# Patient Record
(demographics unavailable — no encounter records)

---

## 2024-10-31 NOTE — DISCUSSION/SUMMARY
[FreeTextEntry1] :   This is 70-year-old female with past medical history significant for familial hypercholesterolemia, statin intolerance, status post hysterectomy, status post hernia repair, history of restless leg syndrome, who comes in for lipid consultation. The patient is followed closely by her cardiologist Dr. Marie. The patient is receiving her Leqvio at an infusion center at [facility].  (She received her first shot March 21, 2024, June 24, 2024, December 2023) She remains on Zetia 10 mg/day. She denies chest pain, shortness of breath, dizziness or syncope. She has no history of rheumatic fever.  She does not drink excessive caffeine or alcohol. Cardiac risk factors include hypercholesterolemia; (mother and father and 2 siblings, and 2 children all have hypercholesterolemia). The patient had a positive genetic test for LDL receptor mutation consistent with heterozygous familial hypercholesterolemia. I have recommended that her first-degree relatives, and children be tested for LDL receptor mutation as well. Have instructed her to contact the genetic testing company who will screen her family as well. Blood work done October 24, 2024 demonstrated lipoprotein a of less than 9 nmol/L. The patient will have blood work done today for lipid panel, lipoprotein B, and direct LDL cholesterol. If she is unable to achieve LDL cholesterol target with Leqvio and Zetia, she is a candidate for Bempedoic Acid. The patient has been unable to tolerate atorvastatin, or rosuvastatin even low dosages, simvastatin. She is currently on Leqvio subcutaneously every 6-month (she had her first dose and her 90-day second dose), and Zetia 10 mg daily. She reports that PCSK9 injectable therapy was cost prohibitive. Lipid panel done July 24, 2024 on Leqvio therapy demonstrated total cholesterol of 333 mg/dL, HDL 53 mg/dL, triglycerides 221 mg/dL, LDL calculated 239 mg/dL, and elevated lipoprotein B of 200 mg/dL. The patient has findings consistent with familial hypercholesterolemia which appears to be unresponsive to lipid-lowering therapy. If the patient is unable to achieve LDL target levels, despite effective lipid-lowering therapy, she would be a candidate for Lipid Apheresis for familial hypercholesterolemia despite maximal lipid-lowering therapy. The patient reports no known history of coronary artery disease, however I would recommend she schedule coronary artery calcium score to see if there is evidence for early atherosclerotic heart disease.  The patient is instructed follow-up with her primary cardiologist, Dr. Marie. If the patient is unable to achieve adequate LDL cholesterol, she remains a candidate for Lipid Apheresis Further recommendations are made after results of her blood work are available for my review. The patient understands that aerobic exercises must be increased to 40 minutes 4 times per week. A detailed discussion of lifestyle modification was done today. The patient has a good understanding of the diagnosis, and treatment plan. Lifestyle modification was also outlined. Thank you for allowing to participate in the care of your patient.  Please do not hesitate to call if you have any further questions.

## 2024-10-31 NOTE — PHYSICAL EXAM
[Well Developed] : well developed [Well Nourished] : well nourished [No Acute Distress] : no acute distress [Normal Conjunctiva] : normal conjunctiva [Normal Venous Pressure] : normal venous pressure [No Carotid Bruit] : no carotid bruit [Normal S1, S2] : normal S1, S2 [No Murmur] : no murmur [No Rub] : no rub [No Gallop] : no gallop [5th Left ICS - MCL] : palpated at the 5th LICS in the midclavicular line [Normal] : normal [No Precordial Heave] : no precordial heave was noted [Normal Rate] : normal [Rhythm Regular] : regular [Normal S1] : normal S1 [Normal S2] : normal S2 [II] : a grade 2 [No Pitting Edema] : no pitting edema present [2+] : left 2+ [Clear Lung Fields] : clear lung fields [Good Air Entry] : good air entry [No Respiratory Distress] : no respiratory distress  [Soft] : abdomen soft [Non Tender] : non-tender [No Masses/organomegaly] : no masses/organomegaly [Normal Bowel Sounds] : normal bowel sounds [Normal Gait] : normal gait [No Edema] : no edema [No Cyanosis] : no cyanosis [No Clubbing] : no clubbing [No Varicosities] : no varicosities [No Rash] : no rash [No Skin Lesions] : no skin lesions [Moves all extremities] : moves all extremities [No Focal Deficits] : no focal deficits [Normal Speech] : normal speech [Alert and Oriented] : alert and oriented [Normal memory] : normal memory [Apical Thrill] : no thrill palpable at the apex [S3] : no S3 [S4] : no S4 [Click] : no click [Pericardial Rub] : no pericardial rub [Rt] : no varicose veins of the right leg [Lt] : no varicose veins of the left leg [Right Carotid Bruit] : no bruit heard over the right carotid [Left Carotid Bruit] : no bruit heard over the left carotid [Bruit] : no bruit heard

## 2024-10-31 NOTE — REASON FOR VISIT
[CV Risk Factors and Non-Cardiac Disease] : CV risk factors and non-cardiac disease [Hyperlipidemia] : hyperlipidemia [FreeTextEntry3] : Dr. Sean Marie  [FreeTextEntry1] :   This is a 71 y/o female with pmhx of HLD, cervical cancer s/p hysterectomy, s/p hernia repair, restless leg syndrome. Presents to our clinic for lipid follow-up evaluation referred by Dr. Sean Marie at [facility]   CHIEF COMPLAINT: Patient denies any current complaints. Denies chest pain, palpitations, shortness of breath, syncope, THOMAS, orthopnea, or lower extremity edema. MEDICATION: On Leqvio, Ezetimibe, Coq10 Patient has been on statins for years, continuously changing classes due to intolerance. Stopped taking Rosuvastatin in 2024 due to incapacitating leg cramping and upper extremity myalgias.  She was on Repatha and praluent up until December due to lack of sufficient insurance coverage with copay of $595. She was started on Inclisiran (Leqvio) has had 2 doses so far last one in    Never tested for LP (a), Never had a calcium score   Fmhx: Parents, 2 sisters, 2 children with HLD Scoial hx: non smoker, social alcohol intkae, drinks 2 cups of coffee per day  [facility] Hx: hysterectomy , hernia repair ,   Labs and procedures: : LDL: 239, T< HDL: 53, Cholesterol: 333, ApoB: 200 Plan:  Patient with severe statin intolerance and incapacitating cramps (failed Atorvastatin and rosuvastatin) presenting from primary cardiologist for elevated LDL to 239 despite being on inclisiran and ezetimibe. Prior use of Repatha and Praluent.  Patient has been re-challenged with statins, remains not tolerant  Will do genetic testing, assess LP(a0) levels Patient was informed on possible need of lipid apheresis if LDL levels are not within range and elevated LP(a) Pending calcium score assessment for CAD in next viist.

## 2025-02-25 NOTE — DISCUSSION/SUMMARY
[FreeTextEntry1] : This is 71-year-old female with past medical history significant for familial hypercholesterolemia, positive genetic mutation for LDL receptor gene, statin intolerance, status post hysterectomy, status post hernia repair, history of restless leg syndrome, who comes in for lipid follow-up evaluation. The patient is followed closely by her cardiologist Dr. Marie. The patient is receiving her Leqvio at an infusion center at [facility].  (She received her first shot March 21, 2024, June 24, 2024, December 2023) She remains on Zetia 10 mg/day. She denies chest pain, shortness of breath, dizziness or syncope. She has no history of rheumatic fever.  She does not drink excessive caffeine or alcohol. Cardiac risk factors include hypercholesterolemia; (mother and father and 2 siblings, and 2 children all have hypercholesterolemia). The patient had a positive genetic test for LDL receptor mutation consistent with heterozygous familial hypercholesterolemia. I have recommended that her first-degree relatives, and children be tested for LDL receptor mutation as well. I have instructed her to contact the genetic testing company who will screen her family as well.  Lipid panel done January 21, 2025 demonstrated a total cholesterol 336 mg/dL, LDL calculated 236 mg/dL, triglycerides 208 mg/dL, HDL 61 mg/dL, non-HDL cholesterol 275 mg/dL, and LDL direct of 254 mg/dL.  These results are in the setting of Leqvio and Zetia therapy. The patient clearly has familial hypercholesterolemia.  At this point in time the most appropriate therapy for her is lipoprotein apheresis.  I have explained this procedure to her and her  in detail including the placement of 2 subcutaneous ports for the procedure, and initially a biweekly treatment schedule.  They verbalized a good understanding of the procedure, and understand the commitment. She lives over an hour away and would have to pay for transportation, which she will look into. Information was sent for insurance clearance prior to the procedure. She has evidence for coronary artery disease, coronary artery calcifications, familial hypercholesterolemia, Statin intolerance, LDL receptor mutation with ineffectiveness of Leqvio and Zetia therapy. Her lipoprotein a is less than 9 nmol/L. Coronary artery calcium score done November 21, 2024 was 886 units including 208 units in the left anterior descending artery, 172 units in left circumflex artery, 506 units in the right coronary artery, 0 units in left main artery. The patient has been unable to tolerate atorvastatin, or rosuvastatin even low dosages, simvastatin. She is currently on Leqvio subcutaneously every 6-month (she had her first dose and her 90-day second dose), and Zetia 10 mg daily. She reports that PCSK9 injectable therapy was cost prohibitive. Lipid panel done July 24, 2024 on Leqvio therapy demonstrated total cholesterol of 333 mg/dL, HDL 53 mg/dL, triglycerides 221 mg/dL, LDL calculated 239 mg/dL, and elevated lipoprotein B of 200 mg/dL. The patient has findings consistent with familial hypercholesterolemia which appears to be unresponsive to lipid-lowering therapy.  The patient is instructed follow-up with her primary cardiologist, Dr. Marie.  The patient understands that aerobic exercises must be increased to 40 minutes 4 times per week. A detailed discussion of lifestyle modification was done today. The patient has a good understanding of the diagnosis, and treatment plan. Lifestyle modification was also outlined. Thank you for allowing to participate in the care of your patient.  Please do not hesitate to call if you have any further questions.

## 2025-02-25 NOTE — REASON FOR VISIT
[CV Risk Factors and Non-Cardiac Disease] : CV risk factors and non-cardiac disease [Hyperlipidemia] : hyperlipidemia [FreeTextEntry3] : Dr. Sean Marie  [FreeTextEntry1] : 70 y/o female with pmhx of HLD, cervical cancer s/p hysterectomy, s/p hernia repair, restless leg syndrome presents for lipid follow-up evaluation referred by Dr. Sean Marie at [facility] [place]   CHIEF COMPLAINT: Patient denies any current complaints. Denies chest pain, palpitations, shortness of breath, syncope, THOMAS, orthopnea, or lower extremity edema. MEDICATION: On Leqvio, Ezetimibe, Coq10 Patient has been on statins for years, continuously changing classes due to intolerance. Stopped taking Rosuvastatin in January of 2024 due to incapacitating leg cramping and upper extremity myalgias.  She was on Repatha and praluent up until December due to lack of sufficient insurance coverage with copay of $595. She was started on Inclisiran (Leqvio) has had 2 doses so far last one in June   Fmhx: Parents, 2 sisters, 2 children with HLD Scoial hx: non smoker, social alcohol intkae, drinks 2 cups of coffee per day  [facility] Surgical Hx: hysterectomy 2008, hernia repair 2010, 2011

## 2025-05-19 NOTE — DISCUSSION/SUMMARY
[FreeTextEntry1] : This is 71-year-old female with past medical history significant for familial hypercholesterolemia, positive genetic mutation for LDL receptor gene, coronary artery calcification, (patient had a coronary artery calcium score November 21, 2024 of 886 units), statin intolerance, status post hysterectomy, status post hernia repair, history of restless leg syndrome, who comes in for lipid follow-up evaluation. The patient is followed closely by her cardiologist Dr. Marie. The patient is receiving her Leqvio at an infusion center at [facility].  (She received her first shot March 21, 2024, June 24, 2024, December 2023) She remains on Zetia 10 mg/day. She denies chest pain, shortness of breath, dizziness or syncope. She has no history of rheumatic fever.  She does not drink excessive caffeine or alcohol. Cardiac risk factors include hypercholesterolemia; (mother and father and 2 siblings, and 2 children all have hypercholesterolemia). The patient had a positive genetic test for LDL receptor mutation consistent with heterozygous familial hypercholesterolemia. I have recommended that her first-degree relatives, and children be tested for LDL receptor mutation as well. I have instructed her to contact the genetic testing company who will screen her family as well. The patient has had multiple Leqvio injections.  Lipid panel done April 25, 2025 demonstrated total cholesterol 336, HDL of 50, triglycerides 192, LDL calculated 249 mg/dL, and non-HDL cholesterol 286 mg/dL.  Direct LDL cholesterol of 248 mg/dL and lipoprotein B of 194 mg/dL. The patient has positive receptor mutation defect. Lipoprotein a was less than 9 nmol/L (October 23, 2024). At this point in time medical therapy is ineffective for lowering this patient's LDL cholesterol to LDL target of less than 70 mg/dL. I once again recommended she consider Lipid Apheresis.  She is hesitant due to the transportation issue and getting to apheresis.  She will look into insurance coverage for transportation. She will consider Leqvio for now.  She received this as an infusion center. She will also continue Zetia 10 mg/day. Coronary artery calcium score done November 21, 2024 demonstrated total score of 886 units including 208 units in the left anterior descending artery, 170 0 units left circumflex artery and 506 units right coronary artery with 0 units in the left main artery. Lipid panel done January 21, 2025 demonstrated a total cholesterol 336 mg/dL, LDL calculated 236 mg/dL, triglycerides 208 mg/dL, HDL 61 mg/dL, non-HDL cholesterol 275 mg/dL, and LDL direct of 254 mg/dL. These results are in the setting of Leqvio and Zetia therapy. The patient clearly has familial hypercholesterolemia.  At this point in time the most appropriate therapy for her is lipoprotein apheresis.  I have explained this procedure to her and her  in detail including the placement of 2 subcutaneous ports for the procedure, and initially a biweekly treatment schedule.  They verbalized a good understanding of the procedure, and understand the commitment. She lives over an hour away and will again look into insurance coverage for transportation for treatment.   She has evidence for coronary artery disease, coronary artery calcifications, familial hypercholesterolemia, Statin intolerance, LDL receptor mutation with ineffectiveness of Leqvio and Zetia therapy. Her lipoprotein a is less than 9 nmol/L. Coronary artery calcium score done November 21, 2024 was 886 units including 208 units in the left anterior descending artery, 172 units in left circumflex artery, 506 units in the right coronary artery, 0 units in left main artery. The patient has been unable to tolerate atorvastatin, or rosuvastatin even low dosages, simvastatin. She is currently on Leqvio subcutaneously every 6-month (she had her first dose and her 90-day second dose), and Zetia 10 mg daily. She reports that PCSK9 injectable therapy was cost prohibitive. Lipid panel done July 24, 2024 on Leqvio therapy demonstrated total cholesterol of 333 mg/dL, HDL 53 mg/dL, triglycerides 221 mg/dL, LDL calculated 239 mg/dL, and elevated lipoprotein B of 200 mg/dL. The patient has findings consistent with familial hypercholesterolemia which appears to be unresponsive to lipid-lowering therapy.  The patient is instructed follow-up with her primary cardiologist, Dr. Marie.  The patient understands that aerobic exercises must be increased to 40 minutes 4 times per week. A detailed discussion of lifestyle modification was done today. The patient has a good understanding of the diagnosis, and treatment plan. Lifestyle modification was also outlined. Thank you for allowing to participate in the care of your patient.  Please do not hesitate to call if you have any further questions.

## 2025-05-19 NOTE — REASON FOR VISIT
[CV Risk Factors and Non-Cardiac Disease] : CV risk factors and non-cardiac disease [Hyperlipidemia] : hyperlipidemia [FreeTextEntry3] : Dr. Sean Marie  [FreeTextEntry1] : 72 y/o female with pmhx of HLD, cervical cancer s/p hysterectomy, s/p hernia repair, restless leg syndrome presents for lipid follow-up evaluation referred by Dr. Sean Marie at [facility]   CHIEF COMPLAINT: Patient denies any current complaints. Denies chest pain, palpitations, shortness of breath, syncope, THOMAS, orthopnea, or lower extremity edema. MEDICATION: On Leqvio, Ezetimibe, Coq10 Patient has been on statins for years, continuously changing classes due to intolerance. Stopped taking Rosuvastatin in January of 2024 due to incapacitating leg cramping and upper extremity myalgias.  She was on Repatha and praluent up until December due to lack of sufficient insurance coverage with copay of $595. She was started on Inclisiran (Leqvio) has had 2 doses so far last one in June   Fmhx: Parents, 2 sisters, 2 children with HLD Scoial hx: non smoker, social alcohol intkae, drinks 2 cups of coffee per day  [facility] Surgical Hx: hysterectomy 2008, hernia repair 2010, 2011